# Patient Record
Sex: FEMALE | Race: WHITE | NOT HISPANIC OR LATINO
[De-identification: names, ages, dates, MRNs, and addresses within clinical notes are randomized per-mention and may not be internally consistent; named-entity substitution may affect disease eponyms.]

---
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
## 2020-10-07 ENCOUNTER — OFFICE VISIT (OUTPATIENT)
Dept: FAMILY MEDICINE CLINIC | Facility: CLINIC | Age: 20
End: 2020-10-07
Payer: COMMERCIAL

## 2020-10-07 VITALS
OXYGEN SATURATION: 98 % | SYSTOLIC BLOOD PRESSURE: 110 MMHG | BODY MASS INDEX: 25.52 KG/M2 | HEIGHT: 63 IN | HEART RATE: 81 BPM | TEMPERATURE: 99 F | WEIGHT: 144 LBS | DIASTOLIC BLOOD PRESSURE: 80 MMHG

## 2020-10-07 DIAGNOSIS — R59.1 LYMPHADENOPATHY OF HEAD AND NECK: Primary | ICD-10-CM

## 2020-10-07 DIAGNOSIS — R09.89 RUNNY NOSE: ICD-10-CM

## 2020-10-07 PROCEDURE — 90686 IIV4 VACC NO PRSV 0.5 ML IM: CPT | Performed by: FAMILY MEDICINE

## 2020-10-07 PROCEDURE — 99202 OFFICE O/P NEW SF 15 MIN: CPT | Performed by: FAMILY MEDICINE

## 2020-10-07 PROCEDURE — 3008F BODY MASS INDEX DOCD: CPT | Performed by: FAMILY MEDICINE

## 2020-10-07 PROCEDURE — 3079F DIAST BP 80-89 MM HG: CPT | Performed by: FAMILY MEDICINE

## 2020-10-07 PROCEDURE — 3074F SYST BP LT 130 MM HG: CPT | Performed by: FAMILY MEDICINE

## 2020-10-07 PROCEDURE — 90471 IMMUNIZATION ADMIN: CPT | Performed by: FAMILY MEDICINE

## 2020-10-07 RX ORDER — BIOTIN 10000 MCG
CAPSULE ORAL DAILY
COMMUNITY

## 2020-10-07 RX ORDER — NORETHINDRONE ACETATE AND ETHINYL ESTRADIOL, ETHINYL ESTRADIOL AND FERROUS FUMARATE 1MG-10(24)
KIT ORAL
COMMUNITY
Start: 2020-09-06

## 2020-10-07 RX ORDER — VALACYCLOVIR HYDROCHLORIDE 500 MG/1
TABLET, FILM COATED ORAL
COMMUNITY
Start: 2019-09-25

## 2020-10-07 NOTE — PROGRESS NOTES
HPI:    Patient ID: Prema Alcala is a 23year old female who presents for few concerns. HPI  Pt prefaces with saying she has hx of anxiety. Has painful and swollen lymph nodes below her ears. Hard and tender.    Overall improving over last couple Conjunctivae and EOM are normal. Right eye exhibits no discharge. Left eye exhibits no discharge. No scleral icterus. Neck: Neck supple. No JVD present. No thyromegaly present. Cardiovascular: Normal rate, regular rhythm and normal heart sounds.   Exam

## 2020-11-06 NOTE — ED INITIAL ASSESSMENT (HPI)
Pt here today due to anxiety, panic attacks. Has had a history of this in the past. States that school is really hard right now, and things with the election are making her anxious.

## 2020-11-06 NOTE — ED NOTES
Pt here today due to anxiety/panic attack. NP assessed patient and it was decided she was to go to the emergency room. She is going to go to [address] [place] [address] to be seen for her panic attacks.

## 2020-11-06 NOTE — ED PROVIDER NOTES
Patient Seen in: [facility]      History   Patient presents with: Anxiety/Panic attack    Stated Complaint: Panic attacks    HPI    This is a 42-year-old female with past medical history of anxiety presenting for anxiety and panic attack. Appearance: Normal appearance. HENT:      Head: Normocephalic. Right Ear: External ear normal.      Left Ear: External ear normal.      Nose: Nose normal.      Mouth/Throat:      Mouth: Mucous membranes are moist.      Pharynx: Oropharynx is clear. comfortable driving to the ER for further evaluation. Patient is alert and oriented x3 appropriate to make decisions for herself. Patient states that she will go to [facility] ER. This patient was discussed with Dr. Nano Godinez.                Robson Weber

## 2023-03-06 PROBLEM — F41.9 ANXIETY: Status: ACTIVE | Noted: 2023-03-06

## 2023-03-06 PROBLEM — R21 RASH: Status: ACTIVE | Noted: 2023-03-06

## 2023-03-10 PROBLEM — N91.2 AMENORRHEA: Status: ACTIVE | Noted: 2023-03-10

## 2023-03-10 PROBLEM — K59.00 CONSTIPATION: Status: ACTIVE | Noted: 2019-10-11

## 2023-03-10 PROBLEM — F41.0 PANIC DISORDER: Status: ACTIVE | Noted: 2018-08-16

## 2023-03-10 PROBLEM — L30.9 DERMATITIS, UNSPECIFIED: Status: ACTIVE | Noted: 2023-03-10

## 2023-03-10 PROBLEM — B00.9 HERPESVIRAL INFECTION, UNSPECIFIED: Status: ACTIVE | Noted: 2023-03-10

## 2023-03-10 PROBLEM — F41.1 GENERALIZED ANXIETY DISORDER: Status: ACTIVE | Noted: 2023-03-10

## 2023-06-07 LAB — CYTOLOGY CVX/VAG DOC THIN PREP: NORMAL

## 2024-01-31 PROBLEM — R61 NIGHT SWEATS: Status: ACTIVE | Noted: 2024-01-31
